# Patient Record
Sex: FEMALE | Race: WHITE | NOT HISPANIC OR LATINO | Employment: FULL TIME | URBAN - METROPOLITAN AREA
[De-identification: names, ages, dates, MRNs, and addresses within clinical notes are randomized per-mention and may not be internally consistent; named-entity substitution may affect disease eponyms.]

---

## 2017-09-29 ENCOUNTER — OFFICE VISIT - RIVER FALLS (OUTPATIENT)
Dept: FAMILY MEDICINE | Facility: CLINIC | Age: 37
End: 2017-09-29

## 2017-09-29 ASSESSMENT — MIFFLIN-ST. JEOR: SCORE: 1905.08

## 2017-10-18 ENCOUNTER — OFFICE VISIT - RIVER FALLS (OUTPATIENT)
Dept: FAMILY MEDICINE | Facility: CLINIC | Age: 37
End: 2017-10-18

## 2017-10-30 ENCOUNTER — OFFICE VISIT - RIVER FALLS (OUTPATIENT)
Dept: FAMILY MEDICINE | Facility: CLINIC | Age: 37
End: 2017-10-30

## 2017-10-30 ASSESSMENT — MIFFLIN-ST. JEOR: SCORE: 1922.54

## 2018-02-07 ENCOUNTER — OFFICE VISIT - RIVER FALLS (OUTPATIENT)
Dept: FAMILY MEDICINE | Facility: CLINIC | Age: 38
End: 2018-02-07

## 2018-02-07 ASSESSMENT — MIFFLIN-ST. JEOR: SCORE: 1894.19

## 2018-02-16 ENCOUNTER — OFFICE VISIT - RIVER FALLS (OUTPATIENT)
Dept: FAMILY MEDICINE | Facility: CLINIC | Age: 38
End: 2018-02-16

## 2022-02-11 VITALS
TEMPERATURE: 97.5 F | WEIGHT: 252 LBS | HEART RATE: 59 BPM | SYSTOLIC BLOOD PRESSURE: 107 MMHG | HEIGHT: 71 IN | BODY MASS INDEX: 35.28 KG/M2 | DIASTOLIC BLOOD PRESSURE: 72 MMHG | TEMPERATURE: 98.6 F

## 2022-02-11 VITALS
BODY MASS INDEX: 36.15 KG/M2 | SYSTOLIC BLOOD PRESSURE: 122 MMHG | BODY MASS INDEX: 35.61 KG/M2 | WEIGHT: 258.25 LBS | SYSTOLIC BLOOD PRESSURE: 102 MMHG | HEIGHT: 71 IN | DIASTOLIC BLOOD PRESSURE: 64 MMHG | WEIGHT: 257 LBS | DIASTOLIC BLOOD PRESSURE: 74 MMHG | HEART RATE: 72 BPM | WEIGHT: 254.4 LBS | TEMPERATURE: 97.6 F | BODY MASS INDEX: 35.84 KG/M2 | OXYGEN SATURATION: 98 % | HEIGHT: 71 IN | HEART RATE: 70 BPM

## 2022-02-16 NOTE — PROGRESS NOTES
Patient:   ALTAGRACIA HUFFMAN            MRN: [id number]            FIN: [id number]               Age:   37 years     Sex:  Female     :  1980   Associated Diagnoses:   Binge eating disorder; Obesity   Author:   Treva Vasquez      Visit Information   Visit type:  Medical Nutrition Therapy.    Referral source:  Valery Mckeon MD.       Chief Complaint   Binge Eating Disorder (BED), Obesity       Interval History   Pt states she recently realized that she had a true eating disorder.  She states she has had a issue with binge eating most of her life.    Pt is  and has her 6yo son full time.  She never binges in front of him.  Binges happen only alone and typically in the car or in the evening after he is in bed.    Trigger foods: salty, crunchy, pastas, fast foods, will drink super size regular soda if going through drive up     frequently skipping breakfast and doesn't have am snack, does not ever bring a lunch - will overeat at a fast food resturant drive through, evening meal with son but not always balanced, evening binges and does have TV on or electronical device.             Health Status   Allergies:    Allergic Reactions (Selected)  Severity Not Documented  Latex (No reactions were documented)  Penicillin (No reactions were documented)   Medications:  (Selected)   Prescriptions  Prescribed  Imitrex 100 mg oral tablet: 1 tab(s) ( 100 mg ), PO, Once, Instructions: may repeat dose in 2 hours if needed, PRN: for migraine headache, # 9 tab(s), 11 Refill(s), Type: Soft Stop, Pharmacy: [facility], 1 tab(s) po once,PRN:for migraine headache,Instr:may repeat dos...  buPROPion 150 mg/24 hours (XL) oral tablet, extended release: 1 tab(s) ( 150 mg ), po, q 24 hrs, # 30 tab(s), 3 Refill(s), Type: Maintenance, Pharmacy: [facility], 1 tab(s) po q 24 hrs  loratadine 10 mg oral tablet: 1 tab(s) ( 10 mg ), PO, Daily, PRN: as needed for allergy symptoms, # 30 tab(s), 11 Refill(s), Type:  Maintenance, Pharmacy: [id number], 1 tab(s) po daily,PRN:as needed for allergy symptoms  Documented Medications  Documented  D3 1000: ( 1,000 International Unit ), po, daily, 0 Refill(s), Type: Maintenance  Multiple Vitamins oral tablet: 1 tab(s), po, daily, 0 Refill(s), Type: Maintenance  Vitamin B12: 0 Refill(s), Type: Maintenance  Xanax: po, tid, 0 Refill(s), Type: Maintenance   Problem list:    All Problems  Binge eating disorder / SNOMED CT 0697714441 / Confirmed  Anxiety and depression / SNOMED CT 117543431 / Confirmed  Obesity / SNOMED CT 9228766151 / Probable      Histories   Past Medical History:    No active or resolved past medical history items have been selected or recorded.   Family History:    Migraine  Mother  Multiple sclerosis  Father     Procedure history:    No active procedure history items have been selected or recorded.   Social History:        Alcohol Assessment            Current, 1-2 times per year, 1 drinks/episode average.  2 drinks/episode maximum.      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      Home and Environment Assessment            Marital status: Single.  Risks in environment: sometimes wears bike helmet.      Nutrition and Health Assessment            Type of diet: Regular.      Exercise and Physical Activity Assessment            Exercise frequency: Never.      Sexual Assessment            Sexually active: No.        Physical Examination   Measurements from flowsheet : Measurements   10/30/2017 1:22 PM CDT Height Measured - Standard 71 in    Weight Measured - Standard 258.25 lb    BSA 2.42 m2    Body Mass Index 36.01 kg/m2         Impression and Plan   Diagnosis     Binge eating disorder (BAS95-GC F50.81).     Obesity (PSM57-BN E66.9).       Education today on   - moving towards mindfulness  "of the experience of eating, understanding and listening to body signals  - do not eat if doing anything else besides eating (no TV, electronics, book, etc.)    - eat with awareness, choosing what and how much to eat, not eating because of routine  - hunger/ fullness scale  - 100 things to do besides eating    Starting with small realistic goals   Fluids: eliminating regular sodas and artifically sweetened drinks/products   (teas, coffee, water with lemon/lime, Bess)  work towards 12 cups of fluids/ day 2 cups before and after meals    am: eat breakfast - choose 1 from the following options   light whole grain english muffin with an egg  Protein shake   smoothie with tofu, flax, fruit etc    snack: 1/4 cup nuts 2-3 days/ week, vegetables in ziplock bag 2-3 days/ week with 2T hummus    Lunch: bring from home  \"healthier\" microwavable meals Janelle's, healthy choice steamers 1 meal  leftovers stirfry, taco salad, broth soup can have with a fruit    Evening: whatever it is make sure there is a vegetable with it    As a result of improved eating habits during the day pt will hopefully in turn have better habits in the evening    (Did not want to overwhelm pt today with a lot of changes) - starting with fluids and am/noon meals    Overall calorie recommendation 1850 - 2100    Pt to f/u in 6 weeks       Professional Services   Time spent with pt 60 min   cc Dr. Mckeon  "

## 2022-02-16 NOTE — PROGRESS NOTES
Chief Complaint  f/u medicaiton/ ears  History of Present Illness  Patient is here today to follow-up on her binge eating disorder.,  Mood disorder, and serous otitis media.  She reports that she has been taking the bupropion 12 hour release pill twice daily and is tolerating it well.  She is not sure if it is helping her mood but she knows that her mood is not worsening at all.  He thinks that it is probably helping some overall though.  She does find it a little difficult to remember to take the pill twice daily and would like to try the 150 mg once daily dosing.  In regards to the binge eating disorder.  That continues to be work progress.  Establishing care with a counselor and meeting with the dietitian.  He is also going to be taking a community cooking class that the dietitian teaches.  In regards to her ears, she reports she has been taking her Flonase.  He would like us to recheck to see if there is fluid in her ears today.  Review of systems is as per HPI, otherwise negative.  Exam general alert and oriented ×3 in no acute distress.  HEENT pupils are equally round and reactive to light and extraocular motion is intact.  Panic membranes bilaterally continue to have air-fluid levels, the right ear is rather dull however the left ear.  Both eardrums are little retracted.  There is no redness her mucous membranes are moist and pink and no pharyngeal cobblestoning is present.  Neuro cranial nerves II through XII are grossly intact and patient has a normal gait.  Psych.  Lack  Assessment and plan patient with binge eating disorder, mixed depression and anxiety, patient is going to continue to follow-up with dietitian and continues to believe that as her mood improves her binge eating will improve also.  With her Flonase and we can reevaluate with patient when she comes in to follow-up with her medication change in 2-4 weeks.  May consider referral to audiology at that time.  15 minutes was spent with patient in  direct face-to-face contact of which greater than 50% of the time was spent counseling patient  Physical Exam     Vitals & Measurements    T: 97.6(Tympanic)  HR: 70(Peripheral)  BP: 122/74     WT: 257 lb   Assessment/Plan  Binge eating disorder      Ordered:  Return to Clinic (Request), RFV: follow up meds, please give phq9 and ALEXA 7 when rooming, Return in 3 weeks     Orders:    buPROPion, 1 tab(s) ( 150 mg ), po, q 24 hrs, # 30 tab(s), 3 Refill(s), Type: Maintenance, Pharmacy: [id number], 1 tab(s) po q 24 hrs  Patient Information  Name:  ALTAGRACIA HUFFMAN  Address:   [address] [place] [id number]  Sex: Female  YOB: 1980  Phone: [phone]  Emergency Contact: KWAME WOODSON  MRN: [id number]  FIN: [id number]  Location: [facility]  Date of Service: 10/18/2017  Primary Care Physician:   Lashawn Cervantes, [phone]  Problem List/Past Medical History   Ongoing    Binge eating disorder    Obesity   Historical  Medications    buPROPion 150 mg/24 hours (XL) oral tablet, extended release, 150 mg= 1 tab(s), po, q 24 hrs, 3 refills    D3 1000, 1000 International Unit, po, daily    Imitrex 100 mg oral tablet, 100 mg= 1 tab(s), po, once, PRN, 11 refills    loratadine 10 mg oral tablet, 10 mg= 1 tab(s), po, daily, PRN, 11 refills    Multiple Vitamins oral tablet, 1 tab(s), po, daily    Vitamin B12    Xanax, po, tid  Allergies  Latex  penicillin  Social History    Alcohol - 10/03/2017     Current, 1-2 times per year, 1 drinks/episode average. 2 drinks/episode maximum.    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    Exercise and Physical Activity - 10/03/2017     Exercise frequency: Never.    Home and Environment - 10/03/2017     Marital status: Single. Risks in environment: sometimes wears bike helmet.    Nutrition and Health - 10/03/2017     Type of diet: Regular.    Sexual - 10/03/2017     Sexually active: No.     Substance Abuse - 10/03/2017     Never    Tobacco - 10/03/2017     Never (less than 100 in lifetime)  Family History    Migraine: Mother.    Multiple sclerosis: Father.  Lab Results  Results (Last 90 days)  No results located.

## 2022-02-16 NOTE — PROGRESS NOTES
Patient:   ALTAGRACIA HUFFMAN            MRN: [id number]            FIN: [id number]               Age:   37 years     Sex:  Female     :  1980   Associated Diagnoses:   None   Author:   Toni Mcbride MD      Visit Information      Primary Care Provider (PCP):  Lashawn Cervantes    NPI# [id number]      Referring Provider:  Parminder Garland MD    NPI# [id number]      Chief Complaint   2018 1:46 PM CST    pt here to discuss fluid in ears, has had a couple of ear infections, and just recently finished antibiotics, says her ears have never really been a problem until recently, has tried flonase and claritin and this has not helped to drain it out        History of Present Illness   Asked to see by Slime for evaluation of ear problems.  Patient reports that she has never had problems with her ears until 7 months ago.  Since then she has had problems with pressure in the ears.  She had another ear infection recently.  She was told that she had fluid behind the ears.  No vertigo.  She gets migraines.        Review of Systems   Constitutional:  Negative.    Ear/Nose/Mouth/Throat:  Negative except as documented in history of present illness.    Respiratory:  Negative.       Health Status   Allergies:    Allergic Reactions (Selected)  Severity Not Documented  Latex (No reactions were documented)  Penicillin (No reactions were documented)   Medications:  (Selected)   Prescriptions  Prescribed  Imitrex 100 mg oral tablet: 1 tab(s) ( 100 mg ), PO, Once, Instructions: may repeat dose in 2 hours if needed, PRN: for migraine headache, # 9 tab(s), 11 Refill(s), Type: Soft Stop, Pharmacy: [facility], 1 tab(s) po once,PRN:for migraine headache,Instr:may repeat dos...  buPROPion 150 mg/24 hours (XL) oral tablet, extended release: 1 tab(s) ( 150 mg ), po, q 24 hrs, # 30 tab(s), 3 Refill(s), Type: Maintenance, Pharmacy: [facility], 1 tab(s) po q 24 hrs  Documented Medications  Documented  D3 1000: (  1,000 International Unit ), po, daily, 0 Refill(s), Type: Maintenance  Multiple Vitamins oral tablet: 1 tab(s), po, daily, 0 Refill(s), Type: Maintenance  Vitamin B12: 0 Refill(s), Type: Maintenance  Xanax: po, tid, 0 Refill(s), Type: Maintenance   Problem list:    All Problems  Binge eating disorder / SNOMED CT 4683149402 / Confirmed  Anxiety and depression / SNOMED CT 567298166 / Confirmed  Obesity / SNOMED CT 3872545518 / Probable      Histories   Past Medical History:    No active or resolved past medical history items have been selected or recorded.   Family History:    Migraine  Mother  Multiple sclerosis  Father     Procedure history:    No active procedure history items have been selected or recorded.      Physical Examination   Vital Signs   2/16/2018 1:46 PM CST    Temperature Tympanic      98.6 DegF     CONSTITUTIONAL  General Appearance:  Normal, well developed, well nourished, no obvious distress  Ability to Communicate:  communicates appropriately.    HEAD AND FACE  Appearance and Symmetry:  Normal, no scalp or facial scarring or suspicious lesions.  Paranasal sinuses tenderness:  Normal, Paranasal sinuses non tender    EARS  Clinical speech reception threshold:  Normal, able to hear normal speech.  Auricle:  Normal, Auricles without scars, lesions, masses.  External auditory canal:  Normal, External auditory canal normal.  Tympanic membrane:  Normal, Tympanic membranes normal without swelling or erythema.  Tympanic membrane mobility:  Normal, Normal tympanic membrane mobility.    NOSE (speculum or scope)  Architecture:  Normal, Grossly normal external nasal architecture with no masses or lesions.  Mucosa:  Normal mucosa, No polyps or masses.  Septum:  Normal, Septum non-obstructing.  Turbinates:  Normal, No turbinate abnormalities    ORAL CAVITY AND OROPHARYNX  Lips:  Normal.  Dental and gingiva:  Normal, No obvious dental or gingival disease.  Mucosa:  Normal, Moist mucous membranes.  Tongue:   Normal, Tongue mobile with no mucosal abnormalities  Hard and soft palate:  Normal, Hard and soft palate without cleft or mucosal lesions.  Oral pharynx:  Normal, Posterior pharynx without lesions or remarkable asymmetry.  Saliva:  Normal, Clear saliva.  Masses:  Normal, No palpable masses or pathologically enlarged lymph nodes.    NECK  Masses/lymph nodes:  Normal, No worrisome neck masses or lymph nodes.  Salivary glands:  Normal, Parotid and submandibular glands.  Trachea and larynx position:  Normal, Trachea and larynx midline.  Thyroid:  Normal, No thyroid abnormality.  Tenderness:  Normal, No cervical tenderness.  Suppleness:  Normal, Neck supple    NEUROLOGICAL  Speech pattern:  Normal, Proasaic    RESPIRATORY  Symmetry and Respiratory effort:  Normal, Symmetric chest movement and expansion with no increased intercostal retractions or use of accessory muscles.       Impression and Plan   Symptoms consistent with ET dysfunction.  No evidence of infection today.  I discussed the pathophysiology using online images.  No treatment necessary.  Follow up as needed.

## 2022-02-16 NOTE — PROGRESS NOTES
Patient:   ALTAGRACIA HUFFMAN            MRN: [id number]            FIN: [id number]               Age:   37 years     Sex:  Female     :  1980   Associated Diagnoses:   None   Author:   FrisgovindLashawn Hodge      Visit Information      Date of Service: 2018 06:00 pm  Performing Location: [facility]  Encounter#: [id number]      Primary Care Provider (PCP):  [facility] Lashawn KEYS    NPI# [id number]      Chief Complaint   2018 6:13 PM CST     Patient presents for ears-was infection and now pressure and pain ongoing x 4 months      History of Present Illness   Chief complaint reviewed and confirmed with patient.   The patient presents today with bilateral ear pain.  The pain is greater in the right ear than the left ear.  She has had a runny nose with clear nasal discharge for several weeks.  She was seen previously because she had a lot of fluid buildup in her inner ear.  She is having some difficulties with hearing.  The pain and pressure in her ears has been present for several months.  She previously was diagnosed with otitis media.  Her symptoms began after this.  She is ALLERGIC to penicillin; she had a rash from this.  Of interest, she also notes at today s visit that she believes she may have narcolepsy.  She has questions related to this for screening.          Review of Systems   Constitutional:  Negative.    Eye:  Negative.    Ear/Nose/Mouth/Throat:  Negative except as documented in history of present illness.       Health Status   Allergies:    Allergic Reactions (Selected)  Severity Not Documented  Latex (No reactions were documented)  Penicillin (No reactions were documented)   Medications:  (Selected)   Prescriptions  Prescribed  Ceftin 500 mg oral tablet: 1 tab(s) ( 500 mg ), PO, BID, # 14 tab(s), 0 Refill(s), Type: Maintenance, Pharmacy: [id number], 1 tab(s) po bid,x7 day(s)  Imitrex 100 mg oral tablet: 1 tab(s) ( 100 mg ), PO, Once, Instructions: may repeat dose in 2  hours if needed, PRN: for migraine headache, # 9 tab(s), 11 Refill(s), Type: Soft Stop, Pharmacy: [facility], 1 tab(s) po once,PRN:for migraine headache,Instr:may repeat dos...  buPROPion 150 mg/24 hours (XL) oral tablet, extended release: 1 tab(s) ( 150 mg ), po, q 24 hrs, # 30 tab(s), 3 Refill(s), Type: Maintenance, Pharmacy: [facility], 1 tab(s) po q 24 hrs  loratadine 10 mg oral tablet: 1 tab(s) ( 10 mg ), PO, Daily, PRN: as needed for allergy symptoms, # 30 tab(s), 11 Refill(s), Type: Maintenance, Pharmacy: [facility], 1 tab(s) po daily,PRN:as needed for allergy symptoms  Documented Medications  Documented  D3 1000: ( 1,000 International Unit ), po, daily, 0 Refill(s), Type: Maintenance  Multiple Vitamins oral tablet: 1 tab(s), po, daily, 0 Refill(s), Type: Maintenance  Vitamin B12: 0 Refill(s), Type: Maintenance  Xanax: po, tid, 0 Refill(s), Type: Maintenance      Physical Examination   Vital Signs   2/7/2018 6:13 PM CST Temperature Tympanic 97.5 DegF  LOW    Peripheral Pulse Rate 59 bpm  LOW    HR Method Electronic    Systolic Blood Pressure 107 mmHg    Diastolic Blood Pressure 72 mmHg    Mean Arterial Pressure 84 mmHg    BP Site Right arm    BP Method Electronic      General:  Alert and oriented, No acute distress.    Eye:  Normal conjunctiva.    HENT:       Ear: Left ear, Tympanic membrane ( Bulging, Erythematous, Fluid in middle ear ).    Respiratory:  Lungs are clear to auscultation.    Cardiovascular:  Normal rate, Regular rhythm.       Impression and Plan   Course:  Worsening.    Plan:    1. Prescription for cephalosporin called in for right otitis media.  2. Referred the patient to Dr. Toni Mcbride for evaluation of chronic inner ear buildup and pain.    3. Self-refer to Dr. Edmondson or Dr. Merida for sleep disturbances.  4. Tylenol or ibuprofen for ear pain.  5. If symptoms do not improve she should follow up sooner but for sure follow up with Dr. Mcbride in the next  several weeks..    Patient Instructions:       Counseled: Patient, Regarding diagnosis, Regarding treatment, Regarding medications.

## 2022-02-16 NOTE — PROGRESS NOTES
Patient:   ALTAGRACIA HUFFMAN            MRN: [id number]            FIN: [id number]               Age:   37 years     Sex:  Female     :  1980   Associated Diagnoses:   None   Author:   Valery Mckeon MD      Visit Information      Date of Service: 2017 09:43 am  Performing Location: [facility]  Encounter#: [id number]      Primary Care Provider (PCP):  Lashawn Cervantes    NPI# [id number]      Referring Provider:  Valery Mckeon MD    NPI# [id number]      Chief Complaint   2017 10:23 AM CDT   Annual px.      History of Present Illness   Patient is here today as a new patient to establish care.  She works as a  and reports that she has binge eating disorder and she would like to work with a dietitian and also with a counselor.  Also see fluid please see scanned in documents on the health history form for further information regarding her past medical surgical social family histories.  Also refer to this for her current medications and drug allergies.  She does have a history of classic migraine headaches.  She needs a refill on her Imitrex.  She usually uses 50 mg per episode.  Sometimes she does need an additional dose.  She thinks that she may have some poorly controlled anxiety or depression please also see scanned in PHQ 9.  She would like to get her mood under better control as a way to help control her binge eating disorder.  She does reports that she consumes food faster than normal until she feels uncomfortably full and then she will also eat the large amount of food when she is not hungry.  She will sometimes eat for long because she is embarrassed about how much she is eating.  After that she will fill it she will feel disgusted or depressed and guilty after the bands.  This overeating does cause a significant amount of stress in her life.  The frequency of the binging depends on how her mood is doing.  It is at least 1-3 binges per week and has been going on  for over 6 months.  She does not have any purging including no vomiting or using of laxatives.      Review of systems as per HPI otherwise negative        On exam general she is alert and oriented ×3 in no acute distress HEENT pupils are equal round reactive to light extraocular motion is intact hearing is grossly intact nares are patent mucous membranes are moist and pink neck is supple there is no thyromegaly or lymphadenopathy lungs are clear to auscultation bilaterally without wheezes rhonchi rales cardiovascular regular rate and rhythm no murmurs gallops or rubs skin is warm and dry without rashes abdomen is soft and has normoactive bowel sounds musculoskeletal gait is normal there is no focal deficits.  Neuro cranial nerves II through XII grossly intact.  Psych patient is clean and casually dressed, makes normal eye contact, memory is intact, judgment seems intact, she has no audio or visual hallucinations, concentration and thought seem appropriate, no suicidal or homicidal ideation            Review of Systems   Constitutional:  Negative except as documented in history of present illness.    Eye:  Negative except as documented in history of present illness.    Ear/Nose/Mouth/Throat:  Negative except as documented in history of present illness.    Respiratory:  Negative except as documented in history of present illness.    Cardiovascular:  Negative except as documented in history of present illness.    Gastrointestinal:  Negative except as documented in history of present illness.    Immunologic:  Negative except as documented in history of present illness.    Integumentary:  Negative except as documented in history of present illness.              Health Status   Allergies:    Allergic Reactions (Selected)  Severity Not Documented  Latex (No reactions were documented)  Penicillin (No reactions were documented)      Physical Examination   Vital Signs   9/29/2017 10:23 AM CDT Peripheral Pulse Rate 72 bpm     HR Method Electronic    Systolic Blood Pressure 102 mmHg    Diastolic Blood Pressure 64 mmHg    Mean Arterial Pressure 77 mmHg    BP Site Right arm    BP Method Manual    Oxygen Saturation 98 %      Measurements from flowsheet : Measurements   9/29/2017 10:23 AM CDT Height Measured - Standard 71 in    Weight Measured - Standard 254.4 lb    BSA 2.4 m2    Body Mass Index 35.48 kg/m2         Impression and Plan   Assessment and plan   1 binge eating disorder will make referral to both dietitian and to counseling.  2 Depressive disorder, will start patient on Wellbutrin 100 mg of the 12 hour medication 1 p.o. twice daily. return to clinic in 2 weeks for follow-up, sooner if needed.

## 2022-02-16 NOTE — PROGRESS NOTES
1) Oral antibiotic twice daily for 7 days  2) Refer to Dr. Mcbride  3) Make appointment to see Dr. Edmondson or Dr. Merida for sleep issues  4) Tylenol or Motrin for ear pain

## 2023-12-12 ENCOUNTER — OFFICE VISIT (OUTPATIENT)
Dept: FAMILY MEDICINE | Facility: CLINIC | Age: 43
End: 2023-12-12
Payer: COMMERCIAL

## 2023-12-12 VITALS
OXYGEN SATURATION: 96 % | BODY MASS INDEX: 41.52 KG/M2 | TEMPERATURE: 99.2 F | SYSTOLIC BLOOD PRESSURE: 100 MMHG | HEART RATE: 75 BPM | RESPIRATION RATE: 16 BRPM | WEIGHT: 290 LBS | DIASTOLIC BLOOD PRESSURE: 70 MMHG | HEIGHT: 70 IN

## 2023-12-12 DIAGNOSIS — Z13.1 SCREENING FOR DIABETES MELLITUS: ICD-10-CM

## 2023-12-12 DIAGNOSIS — Z51.81 ENCOUNTER FOR THERAPEUTIC DRUG MONITORING: ICD-10-CM

## 2023-12-12 DIAGNOSIS — F33.1 MODERATE EPISODE OF RECURRENT MAJOR DEPRESSIVE DISORDER (H): ICD-10-CM

## 2023-12-12 DIAGNOSIS — R05.1 ACUTE COUGH: Primary | ICD-10-CM

## 2023-12-12 DIAGNOSIS — F41.1 GENERALIZED ANXIETY DISORDER: ICD-10-CM

## 2023-12-12 DIAGNOSIS — R79.89 ELEVATED TSH: ICD-10-CM

## 2023-12-12 DIAGNOSIS — R50.9 FEVER, UNSPECIFIED FEVER CAUSE: ICD-10-CM

## 2023-12-12 DIAGNOSIS — Z13.220 LIPID SCREENING: ICD-10-CM

## 2023-12-12 PROBLEM — F50.819 BINGE EATING DISORDER: Status: ACTIVE | Noted: 2019-03-28

## 2023-12-12 LAB
FLUAV AG SPEC QL IA: NEGATIVE
FLUBV AG SPEC QL IA: NEGATIVE

## 2023-12-12 PROCEDURE — 87635 SARS-COV-2 COVID-19 AMP PRB: CPT | Performed by: FAMILY MEDICINE

## 2023-12-12 PROCEDURE — 87804 INFLUENZA ASSAY W/OPTIC: CPT | Mod: QW | Performed by: FAMILY MEDICINE

## 2023-12-12 PROCEDURE — 99203 OFFICE O/P NEW LOW 30 MIN: CPT | Performed by: FAMILY MEDICINE

## 2023-12-12 RX ORDER — RIBOFLAVIN (VITAMIN B2) 400 MG
TABLET ORAL
COMMUNITY
Start: 2023-03-24

## 2023-12-12 RX ORDER — HYDROCODONE/ACETAMINOPHEN 5 MG-500MG
TABLET ORAL
COMMUNITY

## 2023-12-12 RX ORDER — OMEGA-3-ACID ETHYL ESTERS 900 MG/1
CAPSULE, LIQUID FILLED ORAL
COMMUNITY

## 2023-12-12 RX ORDER — DIVALPROEX SODIUM 250 MG/1
TABLET, EXTENDED RELEASE ORAL
Qty: 30 TABLET | Refills: 1 | Status: SHIPPED | OUTPATIENT
Start: 2023-12-12 | End: 2024-02-01

## 2023-12-12 RX ORDER — TOPIRAMATE 100 MG/1
TABLET, FILM COATED ORAL
COMMUNITY
Start: 2023-09-30 | End: 2024-09-25

## 2023-12-12 RX ORDER — NAPROXEN 500 MG/1
TABLET ORAL
COMMUNITY
Start: 2023-01-31 | End: 2024-09-25

## 2023-12-12 RX ORDER — LACTOBACILLUS RHAMNOSUS GG 10B CELL
1 CAPSULE ORAL 2 TIMES DAILY
COMMUNITY

## 2023-12-12 RX ORDER — ELETRIPTAN HYDROBROMIDE 40 MG/1
TABLET, FILM COATED ORAL
COMMUNITY
Start: 2023-09-30

## 2023-12-12 RX ORDER — MAGNESIUM OXIDE 400 MG/1
400 TABLET ORAL
COMMUNITY
Start: 2023-03-24

## 2023-12-12 ASSESSMENT — ENCOUNTER SYMPTOMS
DIZZINESS: 0
HEMATURIA: 0
HEMATOCHEZIA: 0
HEADACHES: 1
COUGH: 1
MYALGIAS: 1
PALPITATIONS: 0
BREAST MASS: 0
FEVER: 0
CONSTIPATION: 0
NAUSEA: 0
FREQUENCY: 1
ABDOMINAL PAIN: 0
NERVOUS/ANXIOUS: 1
CHILLS: 0
ARTHRALGIAS: 1
WEAKNESS: 1
JOINT SWELLING: 0
SHORTNESS OF BREATH: 1
HEARTBURN: 1
DIARRHEA: 1
DYSURIA: 0
PARESTHESIAS: 1
SORE THROAT: 1
EYE PAIN: 0

## 2023-12-12 ASSESSMENT — ANXIETY QUESTIONNAIRES
3. WORRYING TOO MUCH ABOUT DIFFERENT THINGS: NEARLY EVERY DAY
IF YOU CHECKED OFF ANY PROBLEMS ON THIS QUESTIONNAIRE, HOW DIFFICULT HAVE THESE PROBLEMS MADE IT FOR YOU TO DO YOUR WORK, TAKE CARE OF THINGS AT HOME, OR GET ALONG WITH OTHER PEOPLE: EXTREMELY DIFFICULT
5. BEING SO RESTLESS THAT IT IS HARD TO SIT STILL: NEARLY EVERY DAY
GAD7 TOTAL SCORE: 21
7. FEELING AFRAID AS IF SOMETHING AWFUL MIGHT HAPPEN: NEARLY EVERY DAY
GAD7 TOTAL SCORE: 21
2. NOT BEING ABLE TO STOP OR CONTROL WORRYING: NEARLY EVERY DAY
1. FEELING NERVOUS, ANXIOUS, OR ON EDGE: NEARLY EVERY DAY
6. BECOMING EASILY ANNOYED OR IRRITABLE: NEARLY EVERY DAY

## 2023-12-12 ASSESSMENT — PATIENT HEALTH QUESTIONNAIRE - PHQ9
10. IF YOU CHECKED OFF ANY PROBLEMS, HOW DIFFICULT HAVE THESE PROBLEMS MADE IT FOR YOU TO DO YOUR WORK, TAKE CARE OF THINGS AT HOME, OR GET ALONG WITH OTHER PEOPLE: VERY DIFFICULT
5. POOR APPETITE OR OVEREATING: NEARLY EVERY DAY
SUM OF ALL RESPONSES TO PHQ QUESTIONS 1-9: 16
SUM OF ALL RESPONSES TO PHQ QUESTIONS 1-9: 16
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
## 2023-12-12 NOTE — PATIENT INSTRUCTIONS
[facility]    Address: [address], [place] [address]  Hours:   Open ? Closes 6?PM  Phone: [phone]  Suggest an edit   Own     Consider looking at the book overcoming binge eating second edition.    Preventive Health Recommendations  Female Ages 40 to 49    Yearly exam:   See your health care provider every year in order to  Review health changes.   Discuss preventive care.    Review your medicines if your doctor prescribed any.    Get a Pap test every three years (unless you have an abnormal result and your provider advises testing more often).    If you get Pap tests with HPV test, you only need to test every 5 years, unless you have an abnormal result. You do not need a Pap test if your uterus was removed (hysterectomy) and you have not had cancer.    You should be tested each year for STDs (sexually transmitted diseases), if you're at risk.   Ask your doctor if you should have a mammogram.    Have a colonoscopy (test for colon cancer) beginning at age 45.  Ask your provider about other options like a yearly FIT test or Cologuard test every 3 years (stool tests)      Have a cholesterol test every 5 years.     Have a diabetes test (fasting glucose) after age 45. If you are at risk for diabetes, you should have this test every 3 years.    Shots: Get a flu shot each year. Get a tetanus shot every 10 years.     Nutrition:   Eat at least 5 servings of fruits and vegetables each day.  Eat whole-grain bread, whole-wheat pasta and brown rice instead of white grains and rice.  Get adequate Calcium and Vitamin D.      Lifestyle  Exercise at least 150 minutes a week (an average of 30 minutes a day, 5 days a week). This will help you control your weight and prevent disease.  Limit alcohol to one drink per day.  No smoking.   Wear sunscreen to prevent skin cancer.  See your dentist every six months for an exam and cleaning.

## 2023-12-12 NOTE — PROGRESS NOTES
SUBJECTIVE:   Soraida is a 43 year old, presenting for the following:  Physical and Shoulder Pain (Hx of RA in family. Wants blood work to check. )        12/12/2023     3:21 PM   Additional Questions   Roomed by Andreea       Healthy Habits:     Getting at least 3 servings of Calcium per day:  Yes    Bi-annual eye exam:  Yes    Dental care twice a year:  NO    Sleep apnea or symptoms of sleep apnea:  Daytime drowsiness    Diet:  Vegetarian/vegan and Other    Frequency of exercise:  1 day/week    Duration of exercise:  Less than 15 minutes    Taking medications regularly:  Yes    Medication side effects:  None    Additional concerns today:  Yes      Today's PHQ-9 Score:       12/12/2023     3:21 PM   PHQ-9 SCORE   PHQ-9 Total Score MyChart 16 (Moderately severe depression)   PHQ-9 Total Score 16                   Have you ever done Advance Care Planning? (For example, a Health Directive, POLST, or a discussion with a medical provider or your loved ones about your wishes): No, advance care planning information given to patient to review.  Patient plans to discuss their wishes with loved ones or provider.      Social History     Tobacco Use    Smoking status: Never     Passive exposure: Never    Smokeless tobacco: Never   Substance Use Topics    Alcohol use: Not on file             12/12/2023     3:19 PM   Alcohol Use   Prescreen: >3 drinks/day or >7 drinks/week? No     Reviewed orders with patient.  Reviewed health maintenance and updated orders accordingly - { :405123}  {Chronicprobdata (optional):999367}    Breast Cancer Screening:    FHS-7:       12/12/2023     3:22 PM   Breast CA Risk Assessment (FHS-7)   Did any of your first-degree relatives have breast or ovarian cancer? No   Did any of your relatives have bilateral breast cancer? Unknown   Did any man in your family have breast cancer? No   Did any woman in your family have breast and ovarian cancer? Yes   Did any woman in your family have breast cancer  "before age 50 y? No   Do you have 2 or more relatives with breast and/or ovarian cancer? No   Do you have 2 or more relatives with breast and/or bowel cancer? No     {If any of the questions to the BCRA (FHS-7) are answered yes, consider ordering referral for genetic counseling (Optional) :034087}  {AMB Mammogram Decision Support (Optional) :846225}  Pertinent mammograms are reviewed under the imaging tab.    History of abnormal Pap smear: { :880264}     Reviewed and updated as needed this visit by clinical staff   Tobacco  Allergies  Meds              Reviewed and updated as needed this visit by Provider                 {HISTORY OPTIONS (Optional):223047}    Review of Systems   Constitutional:  Negative for chills and fever.   HENT:  Positive for sore throat. Negative for congestion, ear pain and hearing loss.    Eyes:  Positive for visual disturbance. Negative for pain.   Respiratory:  Positive for cough and shortness of breath.    Cardiovascular:  Negative for chest pain, palpitations and peripheral edema.   Gastrointestinal:  Positive for diarrhea and heartburn. Negative for abdominal pain, constipation, hematochezia and nausea.   Breasts:  Negative for tenderness, breast mass and discharge.   Genitourinary:  Positive for frequency and urgency. Negative for dysuria, genital sores, hematuria, pelvic pain, vaginal bleeding and vaginal discharge.   Musculoskeletal:  Positive for arthralgias and myalgias. Negative for joint swelling.   Skin:  Negative for rash.   Neurological:  Positive for weakness, headaches and paresthesias. Negative for dizziness.   Psychiatric/Behavioral:  Positive for mood changes. The patient is nervous/anxious.      {FEMALE ROS (Optional):379438}     OBJECTIVE:   /70 (BP Location: Right arm, Patient Position: Sitting)   Pulse 75   Temp 99.2  F (37.3  C) (Tympanic)   Resp 16   Ht 1.775 m (5' 9.88\")   Wt 131.5 kg (290 lb)   LMP 11/20/2023 (Approximate)   SpO2 96%   BMI 41.76 " "kg/m    Physical Exam  {Exam Choices (Optional):503242}    {Diagnostic Test Results (Optional):794630}    ASSESSMENT/PLAN:   {Diag Picklist:868326}    {Patient advised of split billing (Optional):897111}    Depression Screening Follow Up        12/12/2023     3:21 PM   PHQ   PHQ-9 Total Score 16   Q9: Thoughts of better off dead/self-harm past 2 weeks Not at all     {Last PHQ9 Response (Optional):226117}    Follow Up Actions Taken  {Positive screening follow up actions:379069::\"Crisis resource information provided in After Visit Summary\"}       COUNSELING:  {FEMALE COUNSELING MESSAGES:592765::\"Reviewed preventive health counseling, as reflected in patient instructions\"}        She reports that she has never smoked. She has never been exposed to tobacco smoke. She has never used smokeless tobacco.      {Counseling Resources  [place] Preventive Services Task Force  Cholesterol Screening  Health diet/nutrition  Pooled Cohorts Equation Calculator  [employer] MyPlate  ASA Prophylaxis  Lung CA Screening  Osteoporosis prevention/bone health :335080}  {Breast Cancer Risk Calculator  BRCA-Related Cancer Risk Assessment FHS-7 Tool :484734}  Valery Mckeon MD  [facility]  Answers submitted by the patient for this visit:  Patient Health Questionnaire (Submitted on 12/12/2023)  If you checked off any problems, how difficult have these problems made it for you to do your work, take care of things at home, or get along with other people?: Very difficult  PHQ9 TOTAL SCORE: 16    "

## 2023-12-13 LAB — SARS-COV-2 RNA RESP QL NAA+PROBE: NEGATIVE

## 2023-12-15 ENCOUNTER — E-VISIT (OUTPATIENT)
Dept: FAMILY MEDICINE | Facility: CLINIC | Age: 43
End: 2023-12-15
Payer: COMMERCIAL

## 2023-12-15 DIAGNOSIS — J06.9 VIRAL URI WITH COUGH: Primary | ICD-10-CM

## 2023-12-15 PROCEDURE — 99207 PR NON-BILLABLE SERV PER CHARTING: CPT | Performed by: FAMILY MEDICINE

## 2023-12-15 RX ORDER — GUAIFENESIN 1200 MG/1
1200 TABLET, EXTENDED RELEASE ORAL 2 TIMES DAILY
Qty: 60 TABLET | Refills: 0 | Status: SHIPPED | OUTPATIENT
Start: 2023-12-15 | End: 2024-09-25

## 2023-12-15 RX ORDER — DEXTROMETHORPHAN POLISTIREX 30 MG/5ML
10 SUSPENSION ORAL 2 TIMES DAILY PRN
Qty: 89 ML | Refills: 0 | Status: SHIPPED | OUTPATIENT
Start: 2023-12-15 | End: 2024-09-25

## 2023-12-15 NOTE — PATIENT INSTRUCTIONS
Viral Respiratory Infection: Care Instructions  Overview     A viral respiratory infection is an infection of the nose, sinuses, or throat caused by a virus. Colds and the flu are common types of viral respiratory infections.  The symptoms of a viral respiratory infection often start quickly. They include a fever, sore throat, and runny nose. You may also just not feel well. Or you may not want to eat much.  Most viral infections can be treated with home care. This may include drinking lots of fluids and taking over-the-counter pain medicine. You will probably feel better in 4 to 10 days.  Antibiotics are not used to treat a viral infection. Antibiotics don't kill viruses, so they won't help cure a viral illness.  In some cases, a doctor may prescribe antiviral medicine to help your body fight a serious viral infection.  Follow-up care is a key part of your treatment and safety. Be sure to make and go to all appointments, and call your doctor if you are having problems. It's also a good idea to know your test results and keep a list of the medicines you take.  How can you care for yourself at home?  To prevent dehydration, drink plenty of fluids. Choose water and other clear liquids until you feel better. If you have kidney, heart, or liver disease and have to limit fluids, talk with your doctor before you increase the amount of fluids you drink.  Ask your doctor if you can take an over-the-counter pain medicine, such as acetaminophen (Tylenol), ibuprofen (Advil, Motrin), or naproxen (Aleve). Be safe with medicines. Read and follow all instructions on the label. No one younger than 20 should take aspirin. It has been linked to Reye syndrome, a serious illness.  Be careful when taking over-the-counter cold or flu medicines and Tylenol at the same time. Many of these medicines have acetaminophen, which is Tylenol. Read the labels to make sure that you are not taking more than the recommended dose. Too much  "acetaminophen (Tylenol) can be harmful.  Get plenty of rest.  Use saline (saltwater) nasal washes to help keep your nasal passages open and wash out mucus and allergens. You can buy saline nose sprays at a grocery store or drugstore. Follow the instructions on the package. Or you can make your own at home. Add 1 teaspoon of non-iodized salt and 1 teaspoon of baking soda to 2 cups of distilled or boiled and cooled water. Fill a squeeze bottle or neti pot with the nasal wash. Then put the tip into your nostril, and lean over the sink. With your mouth open, gently squirt the liquid. Repeat on the other side.  Use a vaporizer or humidifier to add moisture to your bedroom. Follow the instructions for cleaning the machine.  Do not smoke or allow others to smoke around you. If you need help quitting, talk to your doctor about stop-smoking programs and medicines. These can increase your chances of quitting for good.  When should you call for help?   Call 911 anytime you think you may need emergency care. For example, call if:    You have severe trouble breathing.   Call your doctor now or seek immediate medical care if:    You have a new or higher fever.     Your fever lasts more than 48 hours.     You have trouble breathing.     You have a fever with a stiff neck or a severe headache.     You are sensitive to light.     You feel very sleepy or confused.   Watch closely for changes in your health, and be sure to contact your doctor if:    You do not get better as expected.   Where can you learn more?  Go to https://www.[employer].net/patiented  Enter Q795 in the search box to learn more about \"Viral Respiratory Infection: Care Instructions.\"  Current as of: June 13, 2023               Content Version: 13.8    6067-7943 [employer].   Care instructions adapted under license by your healthcare professional. If you have questions about a medical condition or this instruction, always ask your healthcare " professional. [employer] disclaims any warranty or liability for your use of this information.      Thank you for choosing us for your care. Dr Mckeon is out of the office and I am helping to cover your box.    Your symptoms are consistent with a viral infection. Viruses take 7-14 days to improve.    I have placed recommendations for medications that can be picked up by prescription or over the counter that can help with cough and congestion. You should also be using tylenol or ibuprofen.    If you aren't feeling better, urgent care is open in [place] on Saturdays and Sundays starting at 9am.    View your full visit summary for details by clicking on the link below. Your pharmacist will able to address any questions you may have about the medication.     If you're not feeling better within 5-7 days, please schedule an appointment.  You can schedule an appointment right here in [facility], or call [phone]      You will not be charged for this evisit.

## 2023-12-31 ENCOUNTER — HEALTH MAINTENANCE LETTER (OUTPATIENT)
Age: 43
End: 2023-12-31

## 2024-02-01 ENCOUNTER — OFFICE VISIT (OUTPATIENT)
Dept: FAMILY MEDICINE | Facility: CLINIC | Age: 44
End: 2024-02-01
Attending: FAMILY MEDICINE
Payer: COMMERCIAL

## 2024-02-01 ENCOUNTER — MYC MEDICAL ADVICE (OUTPATIENT)
Dept: FAMILY MEDICINE | Facility: CLINIC | Age: 44
End: 2024-02-01

## 2024-02-01 ENCOUNTER — ORDERS ONLY (AUTO-RELEASED) (OUTPATIENT)
Dept: FAMILY MEDICINE | Facility: CLINIC | Age: 44
End: 2024-02-01

## 2024-02-01 VITALS
RESPIRATION RATE: 12 BRPM | BODY MASS INDEX: 41.02 KG/M2 | HEART RATE: 58 BPM | HEIGHT: 71 IN | SYSTOLIC BLOOD PRESSURE: 106 MMHG | DIASTOLIC BLOOD PRESSURE: 74 MMHG | WEIGHT: 293 LBS | OXYGEN SATURATION: 96 % | TEMPERATURE: 99 F

## 2024-02-01 DIAGNOSIS — R79.89 ELEVATED TSH: ICD-10-CM

## 2024-02-01 DIAGNOSIS — Z86.69 HISTORY OF MIGRAINE: ICD-10-CM

## 2024-02-01 DIAGNOSIS — E78.5 HYPERLIPIDEMIA LDL GOAL <100: ICD-10-CM

## 2024-02-01 DIAGNOSIS — F33.1 MODERATE EPISODE OF RECURRENT MAJOR DEPRESSIVE DISORDER (H): ICD-10-CM

## 2024-02-01 DIAGNOSIS — Z86.39 HISTORY OF HYPOTHYROIDISM: ICD-10-CM

## 2024-02-01 DIAGNOSIS — N39.3 FEMALE STRESS INCONTINENCE: ICD-10-CM

## 2024-02-01 DIAGNOSIS — Z13.1 SCREENING FOR DIABETES MELLITUS: ICD-10-CM

## 2024-02-01 DIAGNOSIS — M79.601 BILATERAL ARM PAIN: ICD-10-CM

## 2024-02-01 DIAGNOSIS — Z00.00 ROUTINE GENERAL MEDICAL EXAMINATION AT A HEALTH CARE FACILITY: ICD-10-CM

## 2024-02-01 DIAGNOSIS — Z12.11 SCREEN FOR COLON CANCER: ICD-10-CM

## 2024-02-01 DIAGNOSIS — E66.01 MORBID OBESITY (H): Primary | ICD-10-CM

## 2024-02-01 DIAGNOSIS — Z12.31 VISIT FOR SCREENING MAMMOGRAM: Primary | ICD-10-CM

## 2024-02-01 DIAGNOSIS — Z71.89 ACP (ADVANCE CARE PLANNING): ICD-10-CM

## 2024-02-01 DIAGNOSIS — Z13.220 LIPID SCREENING: ICD-10-CM

## 2024-02-01 DIAGNOSIS — E03.9 ACQUIRED HYPOTHYROIDISM: ICD-10-CM

## 2024-02-01 DIAGNOSIS — Z11.59 NEED FOR HEPATITIS C SCREENING TEST: ICD-10-CM

## 2024-02-01 DIAGNOSIS — F50.819 BINGE EATING DISORDER: ICD-10-CM

## 2024-02-01 DIAGNOSIS — F41.1 GENERALIZED ANXIETY DISORDER: ICD-10-CM

## 2024-02-01 DIAGNOSIS — E66.01 MORBID OBESITY (H): ICD-10-CM

## 2024-02-01 DIAGNOSIS — Z51.81 ENCOUNTER FOR THERAPEUTIC DRUG MONITORING: ICD-10-CM

## 2024-02-01 DIAGNOSIS — M79.602 BILATERAL ARM PAIN: ICD-10-CM

## 2024-02-01 DIAGNOSIS — Z11.4 SCREENING FOR HIV (HUMAN IMMUNODEFICIENCY VIRUS): ICD-10-CM

## 2024-02-01 LAB
ALBUMIN SERPL BCG-MCNC: 4.3 G/DL (ref 3.5–5.2)
ALP SERPL-CCNC: 76 U/L (ref 40–150)
ALT SERPL W P-5'-P-CCNC: 15 U/L (ref 0–50)
AMMONIA PLAS-SCNC: 41 UMOL/L (ref 11–51)
ANION GAP SERPL CALCULATED.3IONS-SCNC: 10 MMOL/L (ref 7–15)
AST SERPL W P-5'-P-CCNC: 14 U/L (ref 0–45)
BILIRUB SERPL-MCNC: 0.4 MG/DL
BUN SERPL-MCNC: 12.7 MG/DL (ref 6–20)
CALCIUM SERPL-MCNC: 8.8 MG/DL (ref 8.6–10)
CHLORIDE SERPL-SCNC: 111 MMOL/L (ref 98–107)
CHOLEST SERPL-MCNC: 215 MG/DL
CREAT SERPL-MCNC: 0.82 MG/DL (ref 0.51–0.95)
DEPRECATED HCO3 PLAS-SCNC: 19 MMOL/L (ref 22–29)
EGFRCR SERPLBLD CKD-EPI 2021: 89 ML/MIN/1.73M2
FASTING STATUS PATIENT QL REPORTED: ABNORMAL
GLUCOSE SERPL-MCNC: 91 MG/DL (ref 70–99)
HBA1C MFR BLD: 5.3 % (ref 0–5.6)
HDLC SERPL-MCNC: 36 MG/DL
HIV 1+2 AB+HIV1 P24 AG SERPL QL IA: NONREACTIVE
LDLC SERPL CALC-MCNC: 146 MG/DL
NONHDLC SERPL-MCNC: 179 MG/DL
POTASSIUM SERPL-SCNC: 3.9 MMOL/L (ref 3.4–5.3)
PROT SERPL-MCNC: 7.1 G/DL (ref 6.4–8.3)
SODIUM SERPL-SCNC: 140 MMOL/L (ref 135–145)
T4 FREE SERPL-MCNC: 0.82 NG/DL (ref 0.9–1.7)
TRIGL SERPL-MCNC: 164 MG/DL
TSH SERPL DL<=0.005 MIU/L-ACNC: 5.14 UIU/ML (ref 0.3–4.2)

## 2024-02-01 PROCEDURE — 90686 IIV4 VACC NO PRSV 0.5 ML IM: CPT | Performed by: FAMILY MEDICINE

## 2024-02-01 PROCEDURE — 84443 ASSAY THYROID STIM HORMONE: CPT | Performed by: FAMILY MEDICINE

## 2024-02-01 PROCEDURE — 99215 OFFICE O/P EST HI 40 MIN: CPT | Mod: 25 | Performed by: FAMILY MEDICINE

## 2024-02-01 PROCEDURE — 84439 ASSAY OF FREE THYROXINE: CPT | Performed by: FAMILY MEDICINE

## 2024-02-01 PROCEDURE — 90471 IMMUNIZATION ADMIN: CPT | Performed by: FAMILY MEDICINE

## 2024-02-01 PROCEDURE — 87389 HIV-1 AG W/HIV-1&-2 AB AG IA: CPT | Performed by: FAMILY MEDICINE

## 2024-02-01 PROCEDURE — 80053 COMPREHEN METABOLIC PANEL: CPT | Performed by: FAMILY MEDICINE

## 2024-02-01 PROCEDURE — 91320 SARSCV2 VAC 30MCG TRS-SUC IM: CPT | Performed by: FAMILY MEDICINE

## 2024-02-01 PROCEDURE — 83036 HEMOGLOBIN GLYCOSYLATED A1C: CPT | Performed by: FAMILY MEDICINE

## 2024-02-01 PROCEDURE — 80061 LIPID PANEL: CPT | Performed by: FAMILY MEDICINE

## 2024-02-01 PROCEDURE — 82140 ASSAY OF AMMONIA: CPT | Performed by: FAMILY MEDICINE

## 2024-02-01 PROCEDURE — 90480 ADMN SARSCOV2 VAC 1/ONLY CMP: CPT | Performed by: FAMILY MEDICINE

## 2024-02-01 PROCEDURE — 99396 PREV VISIT EST AGE 40-64: CPT | Mod: 25 | Performed by: FAMILY MEDICINE

## 2024-02-01 PROCEDURE — 36415 COLL VENOUS BLD VENIPUNCTURE: CPT | Performed by: FAMILY MEDICINE

## 2024-02-01 RX ORDER — LEVOTHYROXINE SODIUM 25 UG/1
25 TABLET ORAL DAILY
Qty: 90 TABLET | Refills: 0 | Status: SHIPPED | OUTPATIENT
Start: 2024-02-01

## 2024-02-01 RX ORDER — DIVALPROEX SODIUM 500 MG/1
500 TABLET, EXTENDED RELEASE ORAL DAILY
Qty: 90 TABLET | Refills: 0 | Status: SHIPPED | OUTPATIENT
Start: 2024-02-01 | End: 2024-09-25

## 2024-02-01 SDOH — HEALTH STABILITY: PHYSICAL HEALTH: ON AVERAGE, HOW MANY MINUTES DO YOU ENGAGE IN EXERCISE AT THIS LEVEL?: 60 MIN

## 2024-02-01 SDOH — HEALTH STABILITY: PHYSICAL HEALTH: ON AVERAGE, HOW MANY DAYS PER WEEK DO YOU ENGAGE IN MODERATE TO STRENUOUS EXERCISE (LIKE A BRISK WALK)?: 4 DAYS

## 2024-02-01 ASSESSMENT — PATIENT HEALTH QUESTIONNAIRE - PHQ9
SUM OF ALL RESPONSES TO PHQ QUESTIONS 1-9: 9
10. IF YOU CHECKED OFF ANY PROBLEMS, HOW DIFFICULT HAVE THESE PROBLEMS MADE IT FOR YOU TO DO YOUR WORK, TAKE CARE OF THINGS AT HOME, OR GET ALONG WITH OTHER PEOPLE: SOMEWHAT DIFFICULT
SUM OF ALL RESPONSES TO PHQ QUESTIONS 1-9: 9

## 2024-02-01 ASSESSMENT — SOCIAL DETERMINANTS OF HEALTH (SDOH): HOW OFTEN DO YOU GET TOGETHER WITH FRIENDS OR RELATIVES?: THREE TIMES A WEEK

## 2024-02-01 NOTE — PROGRESS NOTES
Preventive Care Visit  [facility]  Valery Mckeon MD, Family Medicine  Feb 1, 2024    Assessment & Plan   Problem List Items Addressed This Visit       Binge eating disorder    Relevant Medications    Semaglutide-Weight Management (WEGOVY) 0.25 MG/0.5ML pen    Generalized anxiety disorder    Moderate episode of recurrent major depressive disorder (H)    Female stress incontinence     Other Visit Diagnoses       Visit for screening mammogram    -  Primary    Relevant Orders    MA SCREENING DIGITAL BILAT - Future  (s+30)    Screening for diabetes mellitus        Relevant Orders    Comprehensive metabolic panel (BMP + Alb, Alk Phos, ALT, AST, Total. Bili, TP)    Screen for colon cancer        Relevant Orders    COLOGUARD(EXACT SCIENCES)    Screening for HIV (human immunodeficiency virus)        Relevant Orders    HIV Antigen Antibody Combo (Completed)    Need for hepatitis C screening test        Bilateral arm pain        Relevant Orders    Physical Therapy Referral    History of hypothyroidism        Relevant Orders    TSH with free T4 reflex    Routine general medical examination at a health care facility        Morbid obesity (H)        Relevant Medications    Semaglutide-Weight Management (WEGOVY) 0.25 MG/0.5ML pen    Other Relevant Orders    Nutrition Referral    ACP (advance care planning)        Hyperlipidemia LDL goal <100        Relevant Orders    Lipid panel reflex to direct LDL Fasting    History of migraine        Relevant Orders    Adult Neurology  Referral    Elevated TSH        Relevant Orders    T4 free (Completed)    Lipid screening        Encounter for therapeutic drug monitoring               Morbid obesity with stress incontinence, she has no history of thyroid medullary cancer, no family history of multiple endocrine neoplasia, we did discuss the black box warning on GLP-1 agonists regarding risk of thyroid medullary carcinoma, also risk of pancreatitis.   Was able to go to [facility] and confirmed that her insurance does provide benefits for Wegovy.  Order provided for first initial prescription and placed referral for patient to see our dietitian, Niharika Vasquez.    Hypothyroidism patient was previously diagnosed with hypothyroidism and then has not been on any medications and repeat TSH has been normal.  Will get an updated TSH.    Urinary stress incontinence patient declines referral to physical therapy.  Did tell her information about the Perifit device.  Declines referral to urogyn at this time.  We could revisit that in the future if needed.    Bilateral upper arm pain.  Suspect biceps tendinitis.  Referral placed to physical therapy for this.    Screening for diabetes we will get a fasting comprehensive metabolic panel    Screening for colon cancer patient would like to do Cologuard, order provided    Using joint medical decision making ordered HIV test.  Patient declines hepatitis C test.  Reports that has been previously done and was negative.    Advance care planning discussed with patient today.  She is welcome to return a completed copy of the living directive forms provided to her today.        In addition to time spent performing either a procedure or wellness visit today, total time spent reviewing chart and preparing for appointment, with patient for appointment, and time spent charting and coordinating care on the day of the appointment in minutes was:  42                              12/12/2023     3:21 PM 2/1/2024     9:29 AM   PHQ   PHQ-9 Total Score 16 9   Q9: Thoughts of better off dead/self-harm past 2 weeks Not at all Not at all            12/12/2023     4:12 PM   ALEXA-7 SCORE   Total Score 21          FASTING labs           Counseling  Appropriate preventive services were discussed with this patient, including applicable screening as appropriate for fall prevention, nutrition, physical activity, Tobacco-use cessation, weight loss and  cognition.  Checklist reviewing preventive services available has been given to the patient.  Reviewed patient's diet, addressing concerns and/or questions.   The patient was instructed to see the dentist every 6 months.   The patient's PHQ-9 score is consistent with mild depression. She was provided with information regarding depression.             Gurpreet Quigley is a 43 year old, presenting for the following:  Physical (Migraines, Bilateral arm pain, weight management.)        2/1/2024     9:26 AM   Additional Questions   Roomed by [place]        Health Care Directive  Patient does not have a Health Care Directive or Living Will: Discussed advance care planning with patient; however, patient declined at this time.    HPI  Patient is here today for wellness visit as well as to discuss bilateral upper extremity pain, would like to discuss weight management and history of migraine headaches.            2/1/2024   General Health   How would you rate your overall physical health? (!) FAIR   Feel stress (tense, anxious, or unable to sleep) Very much   (!) STRESS CONCERN      2/1/2024   Nutrition   Three or more servings of calcium each day? Yes   Diet: Other   If other, please elaborate: Pescatarian   How many servings of fruit and vegetables per day? (!) 2-3   How many sweetened beverages each day? 0-1         2/1/2024   Exercise   Days per week of moderate/strenous exercise 4 days   Average minutes spent exercising at this level 60 min         2/1/2024   Social Factors   Frequency of gathering with friends or relatives Three times a week   Worry food won't last until get money to buy more No   Food not last or not have enough money for food? No   Do you have housing?  Yes   Are you worried about losing your housing? No   Lack of transportation? No   Unable to get utilities (heat,electricity)? No         2/1/2024   Dental   Dentist two times every year? (!) NO         2/1/2024   TB Screening   Were you born outside of  [place]?  No       Today's PHQ-9 Score:       2/1/2024     9:29 AM   PHQ-9 SCORE   PHQ-9 Total Score MyChart 9 (Mild depression)   PHQ-9 Total Score 9         2/1/2024   Substance Use   Alcohol more than 3/day or more than 7/wk Not Applicable   Do you use any other substances recreationally? No     Social History     Tobacco Use    Smoking status: Never     Passive exposure: Never    Smokeless tobacco: Never   Vaping Use    Vaping Use: Never used           12/12/2023   LAST FHS-7 RESULTS   1st degree relative breast or ovarian cancer No   Any relative bilateral breast cancer Unknown   Any male have breast cancer No   Any woman have breast and ovarian cancer Yes   Any woman with breast cancer before 50yrs No   2 or more relatives with breast and/or ovarian cancer No   2 or more relatives with breast and/or bowel cancer No              2/1/2024   One time HIV Screening   Previous HIV test? Yes         2/1/2024   STI Screening   New sexual partner(s) since last STI/HIV test? No     History of abnormal Pap smear: NO - age 30-65 PAP every 5 years with negative HPV co-testing recommended       The 10-year ASCVD risk score (Pearl QUIGLEY, et al., 2019) is: 1.2%    Values used to calculate the score:      Age: 43 years      Sex: Female      Is Non- : No      Diabetic: No      Tobacco smoker: No      Systolic Blood Pressure: 106 mmHg      Is BP treated: No      HDL Cholesterol: 36 mg/dL      Total Cholesterol: 215 mg/dL        2/1/2024   Contraception/Family Planning   Questions about contraception or family planning No        Reviewed and updated as needed this visit by Provider   Tobacco  Allergies  Meds  Problems  Med Hx  Surg Hx  Fam Hx            History reviewed. No pertinent past medical history.  Past Surgical History:   Procedure Laterality Date    BREAST BIOPSY, RT/LT Left      Review of Systems  Negative except as per HPI     Objective    Exam  /74 (BP Location: Right arm, Patient  "Position: Sitting, Cuff Size: Adult Large)   Pulse 58   Temp 99  F (37.2  C) (Tympanic)   Resp 12   Ht 1.803 m (5' 11\")   Wt 134.3 kg (296 lb)   LMP 02/01/2024 (Exact Date)   SpO2 96%   BMI 41.28 kg/m     Estimated body mass index is 41.28 kg/m  as calculated from the following:    Height as of this encounter: 1.803 m (5' 11\").    Weight as of this encounter: 134.3 kg (296 lb).  Physical Exam      General: alert and oriented ×3 no acute distress.    HEENT: pupils are equal round and reactive to light extraocular motion is intact. Normocephalic and atraumatic.     Hearing is grossly normal and there is no otorrhea.     Chest: has bilateral rise with no increased work of breathing. ctab    Cardiovascular: normal perfusion and brisk capillary refill.s1s2    Musculoskeletal: no gross focal abnormalities and normal gait.    Neuro: no gross focal abnormalities and memory seems intact.    Psychiatric: speech is clear and coherent and fluent. Patient dressed appropriately for the weather. Mood is appropriate and affect is full.                Signed Electronically by: Valery Mckeon MD    "

## 2024-02-01 NOTE — LETTER
My Depression Action Plan  Name: Soraida Griffin   Date of Birth 1980  Date: 2/1/2024    My doctor: Valery Mckeon   My clinic: [address] [address] [phone]            GREEN    ZONE   Good Control    What it looks like:   Things are going generally well. You have normal ups and downs. You may even feel depressed from time to time, but bad moods usually last less than a day.   What you need to do:  Continue to care for yourself (see self care plan)  Check your depression survival kit and update it as needed  Follow your physician s recommendations including any medication.  Do not stop taking medication unless you consult with your physician first.             YELLOW         ZONE Getting Worse    What it looks like:   Depression is starting to interfere with your life.   It may be hard to get out of bed; you may be starting to isolate yourself from others.  Symptoms of depression are starting to last most all day and this has happened for several days.   You may have suicidal thoughts but they are not constant.   What you need to do:     Call your care team. Your response to treatment will improve if you keep your care team informed of your progress. Yellow periods are signs an adjustment may need to be made.     Continue your self-care.  Just get dressed and ready for the day.  Don't give yourself time to talk yourself out of it.    Talk to someone in your support network.    Open up your Depression Self-Care Plan/Wellness Kit.             RED    ZONE Medical Alert - Get Help    What it looks like:   Depression is seriously interfering with your life.   You may experience these or other symptoms: You can t get out of bed most days, can t work or engage in other necessary activities, you have trouble taking care of basic hygiene, or basic responsibilities, thoughts of suicide or death that will not go away, self-injurious  behavior.     What you need to do:  Call your care team and request a same-day appointment. If they are not available (weekends or after hours) call your local crisis line, emergency room or 911.          Depression Self-Care Plan / Wellness Kit    Many people find that medication and therapy are helpful treatments for managing depression. In addition, making small changes to your everyday life can help to boost your mood and improve your wellbeing. Below are some tips for you to consider. Be sure to talk with your medical provider and/or behavioral health consultant if your symptoms are worsening or not improving.     Sleep   Sleep hygiene  means all of the habits that support good, restful sleep. It includes maintaining a consistent bedtime and wake time, using your bedroom only for sleeping or sex, and keeping the bedroom dark and free of distractions like a computer, smartphone, or television.     Develop a Healthy Routine  Maintain good hygiene. Get out of bed in the morning, make your bed, brush your teeth, take a shower, and get dressed. Don t spend too much time viewing media that makes you feel stressed. Find time to relax each day.    Exercise  Get some form of exercise every day. This will help reduce pain and release endorphins, the  feel good  chemicals in your brain. It can be as simple as just going for a walk or doing some gardening, anything that will get you moving.      Diet  Strive to eat healthy foods, including fruits and vegetables. Drink plenty of water. Avoid excessive sugar, caffeine, alcohol, and other mood-altering substances.     Stay Connected with Others  Stay in touch with friends and family members.    Manage Your Mood  Try deep breathing, massage therapy, biofeedback, or meditation. Take part in fun activities when you can. Try to find something to smile about each day.     Psychotherapy  Be open to working with a therapist if your provider recommends it.     Medication  Be sure to  take your medication as prescribed. Most anti-depressants need to be taken every day. It usually takes several weeks for medications to work. Not all medicines work for all people. It is important to follow-up with your provider to make sure you have a treatment plan that is working for you. Do not stop your medication abruptly without first discussing it with your provider.    Crisis Resources   These hotlines are for both adults and children. They and are open 24 hours a day, 7 days a week unless noted otherwise.    National Suicide Prevention Lifeline   988 or 3-310-562-VLQW (4340)    Crisis Text Line    www.crisistextline.org  Text HOME to 318876 from anywhere in the [place], anytime, about any type of crisis. A live, trained crisis counselor will receive the text and respond quickly.    [facility] Lifeline for LGBTQ Youth  A national crisis intervention and suicide lifeline for LGBTQ youth under 25. Provides a safe place to talk without judgement. Call 1-467.165.6677; text START to 696660 or visit www.thetrevorproject.org to talk to a trained counselor.    For [facility] crisis numbers, visit the [facility] website at:  https://[place].gov/dhs/people-we-serve/adults/health-care/mental-health/resources/crisis-contacts.jsp

## 2024-02-01 NOTE — PATIENT INSTRUCTIONS
Check on your Hepatitis B immunization status.    Perifit pelvic floor exerciser    Your Health Risk Assessment indicates you feel you are not in good health    A healthy lifestyle helps keep the body fit and the mind alert. It helps protect you from disease, helps you fight disease, and helps prevent chronic disease (disease that doesn't go away) from getting worse. This is important as you get older and begin to notice twinges in muscles and joints and a decline in the strength and stamina you once took for granted. A healthy lifestyle includes good healthcare, good nutrition, weight control, recreation, and regular exercise. Avoid harmful substances and do what you can to keep safe. Another part of a healthy lifestyle is stay mentally active and socially involved.    Good healthcare   Have a wellness visit every year.   If you have new symptoms, let us know right away. Don't wait until the next checkup.   Take medicines exactly as prescribed and keep your medicines in a safe place. Tell us if your medicine causes problems.   Healthy diet and weight control   Eat 3 or 4 small, nutritious, low-fat, high-fiber meals a day. Include a variety of fruits, vegetables, and whole-grain foods.   Make sure you get enough calcium in your diet. Calcium, vitamin D, and exercise help prevent osteoporosis (bone thinning).   If you live alone, try eating with others when you can. That way you get a good meal and have company while you eat it.   Try to keep a healthy weight. If you eat more calories than your body uses for energy, it will be stored as fat and you will gain weight.     Recreation   Recreation is not limited to sports and team events. It includes any activity that provides relaxation, interest, enjoyment, and exercise. Recreation provides an outlet for physical, mental, and social energy. It can give a sense of worth and achievement. It can help you stay healthy.    Mental Exercise and Social Involvement  Mental and  emotional health is as important as physical health. Keep in touch with friends and family. Stay as active as possible. Continue to learn and challenge yourself.   Things you can do to stay mentally active are:  Learn something new, like a foreign language or musical instrument.   Play SCRABBLE or do crossword puzzles. If you cannot find people to play these games with you at home, you can play them with others on your computer through the Internet.   Join a games club--anything from card games to chess or checkers or lawn bowling.   Start a new hobby.   Go back to school.   Volunteer.   Read.   Keep up with world events.  Eating Healthy Foods: Care Instructions  With every meal, you can make healthy food choices. Try to eat a variety of fruits, vegetables, whole grains, lean proteins, and low-fat dairy products. This can help you get the right balance of nutrients, including vitamins and minerals. Small changes add up over time. You can start by adding one healthy food to your meals each day.    Try to make half your plate fruits and vegetables, one-fourth whole grains, and one-fourth lean proteins. Try including dairy with your meals.   Eat more fruits and vegetables. Try to have them with most meals and snacks.   Foods for healthy eating    Fruits    These can be fresh, frozen, canned, or dried.  Try to choose whole fruit rather than fruit juice.  Eat a variety of colors.    Vegetables    These can be fresh, frozen, canned, or dried.  Beans, peas, and lentils count too.    Whole grains    Choose whole-grain breads, cereals, and noodles.  Try brown rice.    Lean proteins    These can include lean meat, poultry, fish, and eggs.  You can also have tofu, beans, peas, lentils, nuts, and seeds.    Dairy    Try milk, yogurt, and cheese.  Choose low-fat or fat-free when you can.  If you need to, use lactose-free milk or fortified plant-based milk products, such as soy milk.    Water    Drink water when you're  "thirsty.  Limit sugar-sweetened drinks, including soda, fruit drinks, and sports drinks.  Where can you learn more?  Go to https://www.[employer].net/patiented  Enter T756 in the search box to learn more about \"Eating Healthy Foods: Care Instructions.\"  Current as of: February 28, 2023               Content Version: 13.8    1735-3424 [employer].   Care instructions adapted under license by your healthcare professional. If you have questions about a medical condition or this instruction, always ask your healthcare professional. [employer] disclaims any warranty or liability for your use of this information.      Learning About Stress  What is stress?     Stress is your body's response to a hard situation. Your body can have a physical, emotional, or mental response. Stress is a fact of life for most people, and it affects everyone differently. What causes stress for you may not be stressful for someone else.  A lot of things can cause stress. You may feel stress when you go on a job interview, take a test, or run a race. This kind of short-term stress is normal and even useful. It can help you if you need to work hard or react quickly. For example, stress can help you finish an important job on time.  Long-term stress is caused by ongoing stressful situations or events. Examples of long-term stress include long-term health problems, ongoing problems at work, or conflicts in your family. Long-term stress can harm your health.  How does stress affect your health?  When you are stressed, your body responds as though you are in danger. It makes hormones that speed up your heart, make you breathe faster, and give you a burst of energy. This is called the fight-or-flight stress response. If the stress is over quickly, your body goes back to normal and no harm is done.  But if stress happens too often or lasts too long, it can have bad effects. Long-term stress can make you more likely to get " sick, and it can make symptoms of some diseases worse. If you tense up when you are stressed, you may develop neck, shoulder, or low back pain. Stress is linked to high blood pressure and heart disease.  Stress also harms your emotional health. It can make you hong, tense, or depressed. Your relationships may suffer, and you may not do well at work or school.  What can you do to manage stress?  You can try these things to help manage stress:   Do something active. Exercise or activity can help reduce stress. Walking is a great way to get started. Even everyday activities such as housecleaning or yard work can help.  Try yoga or eliu chi. These techniques combine exercise and meditation. You may need some training at first to learn them.  Do something you enjoy. For example, listen to music or go to a movie. Practice your hobby or do volunteer work.  Meditate. This can help you relax, because you are not worrying about what happened before or what may happen in the future.  Do guided imagery. Imagine yourself in any setting that helps you feel calm. You can use online videos, books, or a teacher to guide you.  Do breathing exercises. For example:  From a standing position, bend forward from the waist with your knees slightly bent. Let your arms dangle close to the floor.  Breathe in slowly and deeply as you return to a standing position. Roll up slowly and lift your head last.  Hold your breath for just a few seconds in the standing position.  Breathe out slowly and bend forward from the waist.  Let your feelings out. Talk, laugh, cry, and express anger when you need to. Talking with supportive friends or family, a counselor, or a ysabel leader about your feelings is a healthy way to relieve stress. Avoid discussing your feelings with people who make you feel worse.  Write. It may help to write about things that are bothering you. This helps you find out how much stress you feel and what is causing it. When you know  "this, you can find better ways to cope.  What can you do to prevent stress?  You might try some of these things to help prevent stress:  Manage your time. This helps you find time to do the things you want and need to do.  Get enough sleep. Your body recovers from the stresses of the day while you are sleeping.  Get support. Your family, friends, and community can make a difference in how you experience stress.  Limit your news feed. Avoid or limit time on social media or news that may make you feel stressed.  Do something active. Exercise or activity can help reduce stress. Walking is a great way to get started.  Where can you learn more?  Go to https://www.[employer].net/patiented  Enter N032 in the search box to learn more about \"Learning About Stress.\"  Current as of: February 26, 2023               Content Version: 13.8    4436-2611 [employer].   Care instructions adapted under license by your healthcare professional. If you have questions about a medical condition or this instruction, always ask your healthcare professional. [employer] disclaims any warranty or liability for your use of this information.      Learning About Depression Screening  What is depression screening?  Depression screening is a way to see if you have depression symptoms. It may be done by a doctor or counselor. It's often part of a routine checkup. That's because your mental health is just as important as your physical health.  Depression is a mental health condition that affects how you feel, think, and act. You may:  Have less energy.  Lose interest in your daily activities.  Feel sad and grouchy for a long time.  Depression is very common. It affects people of all ages.  Many things can lead to depression. Some people become depressed after they have a stroke or find out they have a major illness like cancer or heart disease. The death of a loved one or a breakup may lead to depression. It can run in " "families. Most experts believe that a combination of inherited genes and stressful life events can cause it.  What happens during screening?  You may be asked to fill out a form about your depression symptoms. You and the doctor will discuss your answers. The doctor may ask you more questions to learn more about how you think, act, and feel.  What happens after screening?  If you have symptoms of depression, your doctor will talk to you about your options.  Doctors usually treat depression with medicines or counseling. Often, combining the two works best. Many people don't get help because they think that they'll get over the depression on their own. But people with depression may not get better unless they get treatment.  The cause of depression is not well understood. There may be many factors involved. But if you have depression, it's not your fault.  A serious symptom of depression is thinking about death or suicide. If you or someone you care about talks about this or about feeling hopeless, get help right away.  It's important to know that depression can be treated. Medicine, counseling, and self-care may help.  Where can you learn more?  Go to https://www.[employer].net/patiented  Enter T185 in the search box to learn more about \"Learning About Depression Screening.\"  Current as of: June 25, 2023               Content Version: 13.8    1833-0150 [employer].   Care instructions adapted under license by your healthcare professional. If you have questions about a medical condition or this instruction, always ask your healthcare professional. [employer] disclaims any warranty or liability for your use of this information.      Preventive Care Advice   This is general advice given by our system to help you stay healthy. However, your care team may have specific advice just for you. Please talk to your care team about your preventive care needs.  Nutrition  Eat 5 or more servings of " fruits and vegetables each day.  Try wheat bread, brown rice and whole grain pasta (instead of white bread, rice, and pasta).  Get enough calcium and vitamin D. Check the label on foods and aim for 100% of the RDA (recommended daily allowance).  Lifestyle  Exercise at least 150 minutes each week  (30 minutes a day, 5 days a week).  Do muscle strengthening activities 2 days a week. These help control your weight and prevent disease.  No smoking.  Wear sunscreen to prevent skin cancer.  Have a dental exam and cleaning every 6 months.  Yearly exams  See your health care team every year to talk about:  Any changes in your health.  Any medicines your care team has prescribed.  Preventive care, family planning, and ways to prevent chronic diseases.  Shots (vaccines)   HPV shots (up to age 26), if you've never had them before.  Hepatitis B shots (up to age 59), if you've never had them before.  COVID-19 shot: Get this shot when it's due.  Flu shot: Get a flu shot every year.  Tetanus shot: Get a tetanus shot every 10 years.  Pneumococcal, hepatitis A, and RSV shots: Ask your care team if you need these based on your risk.  Shingles shot (for age 50 and up)  General health tests  Diabetes screening:  Starting at age 35, Get screened for diabetes at least every 3 years.  If you are younger than age 35, ask your care team if you should be screened for diabetes.  Cholesterol test: At age 39, start having a cholesterol test every 5 years, or more often if advised.  Bone density scan (DEXA): At age 50, ask your care team if you should have this scan for osteoporosis (brittle bones).  Hepatitis C: Get tested at least once in your life.  STIs (sexually transmitted infections)  Before age 24: Ask your care team if you should be screened for STIs.  After age 24: Get screened for STIs if you're at risk. You are at risk for STIs (including HIV) if:  You are sexually active with more than one person.  You don't use condoms every  time.  You or a partner was diagnosed with a sexually transmitted infection.  If you are at risk for HIV, ask about PrEP medicine to prevent HIV.  Get tested for HIV at least once in your life, whether you are at risk for HIV or not.  Cancer screening tests  Cervical cancer screening: If you have a cervix, begin getting regular cervical cancer screening tests starting at age 21.  Breast cancer scan (mammogram): If you've ever had breasts, begin having regular mammograms starting at age 40. This is a scan to check for breast cancer.  Colon cancer screening: It is important to start screening for colon cancer at age 45.  Have a colonoscopy test every 10 years (or more often if you're at risk) Or, ask your provider about stool tests like a FIT test every year or Cologuard test every 3 years.  To learn more about your testing options, visit:   https://www.[employer]/493250.pdf.  For help making a decision, visit:   https://bit.SIMPLEROBB.COM/rj44659.  Prostate cancer screening test: If you have a prostate, ask your care team if a prostate cancer screening test (PSA) at age 55 is right for you.  Lung cancer screening: If you are a current or former smoker ages 50 to 80, ask your care team if ongoing lung cancer screenings are right for you.  For informational purposes only. Not to replace the advice of your health care provider. Copyright   2023 [employer]. All rights reserved. Clinically reviewed by the [facility]. [employer] [id number] - REV 01/24.

## 2024-02-06 NOTE — TELEPHONE ENCOUNTER
Routing message to PCP for recommendations on alternate medication than semaglutide.   Last OV 2/1/24 physical [facility]JP

## 2024-02-08 NOTE — CONFIDENTIAL NOTE
Please let patient know that I have sent in the starting dose of Trulicity.  It is in the same family of medications as Wegovy.  She will need to do 1 injection every week.  I would also like her to schedule an appointment with our dietitian.  The referral for this was placed on February 1.  If she does not end up taking the Trulicity we will need a follow-up appointment to discuss other medication options.

## 2024-09-25 ENCOUNTER — OFFICE VISIT (OUTPATIENT)
Dept: FAMILY MEDICINE | Facility: CLINIC | Age: 44
End: 2024-09-25
Payer: COMMERCIAL

## 2024-09-25 VITALS
SYSTOLIC BLOOD PRESSURE: 116 MMHG | BODY MASS INDEX: 41.02 KG/M2 | DIASTOLIC BLOOD PRESSURE: 72 MMHG | HEART RATE: 65 BPM | OXYGEN SATURATION: 98 % | HEIGHT: 71 IN | RESPIRATION RATE: 20 BRPM | WEIGHT: 293 LBS | TEMPERATURE: 98.8 F

## 2024-09-25 DIAGNOSIS — G43.109 MIGRAINE WITH AURA AND WITHOUT STATUS MIGRAINOSUS, NOT INTRACTABLE: Primary | ICD-10-CM

## 2024-09-25 DIAGNOSIS — E03.9 ACQUIRED HYPOTHYROIDISM: ICD-10-CM

## 2024-09-25 PROCEDURE — 99214 OFFICE O/P EST MOD 30 MIN: CPT

## 2024-09-25 RX ORDER — SUMATRIPTAN 100 MG/1
100 TABLET, FILM COATED ORAL
Qty: 10 TABLET | Refills: 0 | Status: SHIPPED | OUTPATIENT
Start: 2024-09-25

## 2024-09-25 RX ORDER — SUMATRIPTAN 100 MG/1
100 TABLET, FILM COATED ORAL
COMMUNITY
End: 2024-09-25

## 2024-09-25 RX ORDER — NAPROXEN 500 MG/1
500 TABLET ORAL 2 TIMES DAILY WITH MEALS
Qty: 10 TABLET | Refills: 0 | Status: SHIPPED | OUTPATIENT
Start: 2024-09-25

## 2024-09-25 ASSESSMENT — PATIENT HEALTH QUESTIONNAIRE - PHQ9
SUM OF ALL RESPONSES TO PHQ QUESTIONS 1-9: 8
10. IF YOU CHECKED OFF ANY PROBLEMS, HOW DIFFICULT HAVE THESE PROBLEMS MADE IT FOR YOU TO DO YOUR WORK, TAKE CARE OF THINGS AT HOME, OR GET ALONG WITH OTHER PEOPLE: SOMEWHAT DIFFICULT
SUM OF ALL RESPONSES TO PHQ QUESTIONS 1-9: 8

## 2024-09-25 ASSESSMENT — PAIN SCALES - GENERAL: PAINLEVEL: MODERATE PAIN (5)

## 2024-09-25 NOTE — PROGRESS NOTES
"  Assessment & Plan     Migraine with aura and without status migrainosus, not intractable  Patient with migraine that has been ongoing for approximately 3 days.  She is followed by neurology for control and treatment of her migraines.  She is in clinic today for refill of the medications that work.  She typically has less than 15 migraines per month.  She does occasionally have aura.  She does have light sensitivity today.  She is not currently on a preventative medication.  - SUMAtriptan (IMITREX) 100 MG tablet; Take 1 tablet (100 mg) by mouth at onset of headache for migraine. If symptoms persist or return, may repeat dose (usually same as first dose) after =2 hours. Maximum dose: 200 mg per 24 hours.  - naproxen (NAPROSYN) 500 MG tablet; Take 1 tablet (500 mg) by mouth 2 times daily (with meals). Not to take more than 9 days/month or 2x/week.    Acquired hypothyroidism  Was previously on levothyroxine, then had normal TSH without being on levothyroxine.  She would like to have this rechecked today to see if she should restart levothyroxine.  Patient to be notified of results and plan of care just as needed.  - TSH with free T4 reflex; Future      BMI  Estimated body mass index is 41.14 kg/m  as calculated from the following:    Height as of this encounter: 1.803 m (5' 11\").    Weight as of this encounter: 133.8 kg (295 lb).             Gurpreet Quigley is a 44 year old, presenting for the following health issues:  Migraine (X3 days.Light and sound sensitivity. Nausea and vomiting)      9/25/2024    10:10 AM   Additional Questions   Roomed by leena   Accompanied by n/a     Has had migraine for 3 days, last dose of sumatriptan was last night. Is working with nerurology to get control of migraines. Stopped taking topamax approx 1 year ago. Taking mag citrate and B2 supplementation which is helping with prevention.     Period allergies and stress together have caused today's migraine.     History of Present Illness " "      Headaches:   Since the patient's last clinic visit, headaches are: worsened  The patient is getting headaches:  3 days in row, monthly  She is not able to do normal daily activities when she has a migraine.  The patient is taking the following rescue/relief medications:  Naproxyn (Aleve) and sumatriptan (Imitrex)   Patient states \"I get some relief\" from the rescue/relief medications.   The patient is taking the following medications to prevent migraines:  Topomax  In the past 4 weeks, the patient has gone to an Urgent Care or Emergency Room 0 times times due to headaches.    She eats 2-3 servings of fruits and vegetables daily.She consumes 2 sweetened beverage(s) daily.She exercises with enough effort to increase her heart rate 9 or less minutes per day.  She exercises with enough effort to increase her heart rate 3 or less days per week.   She is taking medications regularly.                     Objective    /72 (BP Location: Right arm, Patient Position: Sitting)   Pulse 65   Temp 98.8  F (37.1  C) (Tympanic)   Resp 20   Ht 1.803 m (5' 11\")   Wt 133.8 kg (295 lb)   LMP 09/19/2024 (Exact Date)   SpO2 98%   BMI 41.14 kg/m    Body mass index is 41.14 kg/m .  Physical Exam     Physical Exam  Constitutional:       Appearance: Normal appearance.   HENT:      Head: Normocephalic.      Mouth/Throat:      Mouth: Mucous membranes are moist.   Eyes:      Extraocular Movements: Extraocular movements intact.      Conjunctiva/sclera: Conjunctivae normal.   Cardiovascular:      Rate and Rhythm: Normal rate.   Pulmonary:      Effort: Pulmonary effort is normal. No respiratory distress.   Skin:     General: Skin is warm and dry.      Capillary Refill: Capillary refill takes less than 2 seconds.   Neurological:      Mental Status: She is alert and oriented to person, place, and time.   Psychiatric:         Behavior: Behavior normal.         Thought Content: Thought content normal.               Signed " Electronically by: LUDMILA Ferrer CNP

## 2024-12-11 DIAGNOSIS — G43.109 MIGRAINE WITH AURA AND WITHOUT STATUS MIGRAINOSUS, NOT INTRACTABLE: ICD-10-CM

## 2024-12-11 RX ORDER — SUMATRIPTAN SUCCINATE 100 MG/1
100 TABLET ORAL
Qty: 10 TABLET | Refills: 0 | Status: SHIPPED | OUTPATIENT
Start: 2024-12-11

## 2025-01-02 ENCOUNTER — PATIENT OUTREACH (OUTPATIENT)
Dept: CARE COORDINATION | Facility: CLINIC | Age: 45
End: 2025-01-02
Payer: COMMERCIAL

## 2025-01-16 ENCOUNTER — PATIENT OUTREACH (OUTPATIENT)
Dept: CARE COORDINATION | Facility: CLINIC | Age: 45
End: 2025-01-16
Payer: COMMERCIAL

## 2025-01-17 PROBLEM — E66.9 OBESITY WITH BODY MASS INDEX 30 OR GREATER: Status: ACTIVE | Noted: 2025-01-17

## 2025-01-17 PROBLEM — G43.909 MIGRAINE: Status: ACTIVE | Noted: 2025-01-17

## 2025-01-17 PROBLEM — A60.00 HERPES, GENITAL: Status: ACTIVE | Noted: 2025-01-17

## 2025-01-22 DIAGNOSIS — J01.00 ACUTE NON-RECURRENT MAXILLARY SINUSITIS: Primary | ICD-10-CM

## 2025-01-22 RX ORDER — AZITHROMYCIN 500 MG/1
500 TABLET, FILM COATED ORAL DAILY
Qty: 7 TABLET | Refills: 0 | Status: SHIPPED | OUTPATIENT
Start: 2025-01-22 | End: 2025-01-29

## 2025-02-04 ENCOUNTER — OFFICE VISIT (OUTPATIENT)
Dept: FAMILY MEDICINE | Facility: CLINIC | Age: 45
End: 2025-02-04
Payer: COMMERCIAL

## 2025-02-04 VITALS
OXYGEN SATURATION: 96 % | DIASTOLIC BLOOD PRESSURE: 75 MMHG | HEIGHT: 71 IN | BODY MASS INDEX: 41.02 KG/M2 | RESPIRATION RATE: 16 BRPM | TEMPERATURE: 99.6 F | WEIGHT: 293 LBS | HEART RATE: 88 BPM | SYSTOLIC BLOOD PRESSURE: 110 MMHG

## 2025-02-04 DIAGNOSIS — R11.0 NAUSEA: Primary | ICD-10-CM

## 2025-02-04 DIAGNOSIS — R05.1 ACUTE COUGH: ICD-10-CM

## 2025-02-04 DIAGNOSIS — R10.11 ABDOMINAL PAIN, RIGHT UPPER QUADRANT: ICD-10-CM

## 2025-02-04 LAB
ALBUMIN SERPL BCG-MCNC: 4.2 G/DL (ref 3.5–5.2)
ALP SERPL-CCNC: 102 U/L (ref 40–150)
ALT SERPL W P-5'-P-CCNC: 35 U/L (ref 0–50)
ANION GAP SERPL CALCULATED.3IONS-SCNC: 15 MMOL/L (ref 7–15)
AST SERPL W P-5'-P-CCNC: 23 U/L (ref 0–45)
BASOPHILS # BLD AUTO: 0 10E3/UL (ref 0–0.2)
BASOPHILS NFR BLD AUTO: 0 %
BILIRUB SERPL-MCNC: 1.1 MG/DL
BUN SERPL-MCNC: 13.6 MG/DL (ref 6–20)
CALCIUM SERPL-MCNC: 9.1 MG/DL (ref 8.8–10.4)
CHLORIDE SERPL-SCNC: 103 MMOL/L (ref 98–107)
CREAT SERPL-MCNC: 0.92 MG/DL (ref 0.51–0.95)
EGFRCR SERPLBLD CKD-EPI 2021: 77 ML/MIN/1.73M2
EOSINOPHIL # BLD AUTO: 0.1 10E3/UL (ref 0–0.7)
EOSINOPHIL NFR BLD AUTO: 1 %
ERYTHROCYTE [DISTWIDTH] IN BLOOD BY AUTOMATED COUNT: 13 % (ref 10–15)
FLUAV AG SPEC QL IA: NEGATIVE
FLUBV AG SPEC QL IA: NEGATIVE
GLUCOSE SERPL-MCNC: 98 MG/DL (ref 70–99)
HCO3 SERPL-SCNC: 21 MMOL/L (ref 22–29)
HCT VFR BLD AUTO: 42.6 % (ref 35–47)
HGB BLD-MCNC: 14.2 G/DL (ref 11.7–15.7)
IMM GRANULOCYTES # BLD: 0 10E3/UL
IMM GRANULOCYTES NFR BLD: 0 %
LYMPHOCYTES # BLD AUTO: 0.8 10E3/UL (ref 0.8–5.3)
LYMPHOCYTES NFR BLD AUTO: 9 %
MCH RBC QN AUTO: 30 PG (ref 26.5–33)
MCHC RBC AUTO-ENTMCNC: 33.3 G/DL (ref 31.5–36.5)
MCV RBC AUTO: 90 FL (ref 78–100)
MONOCYTES # BLD AUTO: 0.5 10E3/UL (ref 0–1.3)
MONOCYTES NFR BLD AUTO: 5 %
NEUTROPHILS # BLD AUTO: 7.3 10E3/UL (ref 1.6–8.3)
NEUTROPHILS NFR BLD AUTO: 85 %
PLATELET # BLD AUTO: 249 10E3/UL (ref 150–450)
POTASSIUM SERPL-SCNC: 4 MMOL/L (ref 3.4–5.3)
PROT SERPL-MCNC: 7.4 G/DL (ref 6.4–8.3)
RBC # BLD AUTO: 4.74 10E6/UL (ref 3.8–5.2)
SARS-COV-2 RNA RESP QL NAA+PROBE: NEGATIVE
SODIUM SERPL-SCNC: 139 MMOL/L (ref 135–145)
WBC # BLD AUTO: 8.6 10E3/UL (ref 4–11)

## 2025-02-04 PROCEDURE — 85025 COMPLETE CBC W/AUTO DIFF WBC: CPT | Mod: QW | Performed by: FAMILY MEDICINE

## 2025-02-04 PROCEDURE — G2211 COMPLEX E/M VISIT ADD ON: HCPCS | Performed by: FAMILY MEDICINE

## 2025-02-04 PROCEDURE — 87804 INFLUENZA ASSAY W/OPTIC: CPT | Mod: QW | Performed by: FAMILY MEDICINE

## 2025-02-04 PROCEDURE — 99214 OFFICE O/P EST MOD 30 MIN: CPT | Performed by: FAMILY MEDICINE

## 2025-02-04 PROCEDURE — 80053 COMPREHEN METABOLIC PANEL: CPT | Performed by: FAMILY MEDICINE

## 2025-02-04 PROCEDURE — 36415 COLL VENOUS BLD VENIPUNCTURE: CPT | Performed by: FAMILY MEDICINE

## 2025-02-04 PROCEDURE — 87635 SARS-COV-2 COVID-19 AMP PRB: CPT | Performed by: FAMILY MEDICINE

## 2025-02-04 RX ORDER — ONDANSETRON 4 MG/1
4 TABLET, ORALLY DISINTEGRATING ORAL EVERY 8 HOURS PRN
Qty: 30 TABLET | Refills: 1 | Status: SHIPPED | OUTPATIENT
Start: 2025-02-04

## 2025-02-04 NOTE — PROGRESS NOTES
Assessment & Plan   Problem List Items Addressed This Visit    None  Visit Diagnoses       Nausea    -  Primary    Relevant Medications    ondansetron (ZOFRAN ODT) 4 MG ODT tab    Acute cough        Relevant Orders    Influenza A & B Antigen - Clinic Collect    COVID-19 Virus (Coronavirus) by PCR    CBC with Platelets & Differential    Comprehensive metabolic panel (BMP + Alb, Alk Phos, ALT, AST, Total. Bili, TP)    Abdominal pain, right upper quadrant        Relevant Orders    Comprehensive metabolic panel (BMP + Alb, Alk Phos, ALT, AST, Total. Bili, TP)        Patient was sick in January, got better, and now is sick again.  Symptoms started less than 3 days ago.  She had a hard time holding anything down last night.  She did find the ondansetron tablets helpful.  She has not noticed decreased urine output.  She has had some abdominal pain along with vomiting.  No blood in the vomitus.  Cough, body aches present no flu shot or COVID-vaccine this year.    On exam ears look fine, lungs are clear, heart is normal, abdomen has an area of tenderness in the left lower quadrant and the right lateral abdomen and seems to have a positive Yost sign no rebound or guarding and normal active bowel sounds    Suspect viral syndrome, cautioned patient that if she develops symptoms of dehydration including decreased urine output or lightheadedness with standing then she needs to go to the emergency room.  Will get a comprehensive metabolic panel and CBC.  If the abdominal pain continues then we will do more of an evaluation for possible gallbladder problems, we will get her switched over to some ondansetron orally disintegrating tablets.  Flu and COVID test are both pending.  If her abdominal pain worsens would like her to go to the ER for further evaluation.    Invited patient to come back for her yearly preventative visit and Pap smear when she is feeling better.    The longitudinal plan of care for the  "diagnosis(es)/condition(s) as documented were addressed during this visit. Due to the added complexity in care, I will continue to support Soraida in the subsequent management and with ongoing continuity of care.       BMI  Estimated body mass index is 41 kg/m  as calculated from the following:    Height as of this encounter: 1.803 m (5' 11\").    Weight as of this encounter: 133.4 kg (294 lb).   Weight management plan: work on TLC          Subjective   Soraida is a 44 year old, presenting for the following health issues:  URI (Patient started having increased sharp abdominal pains, vomiting, congestion, cough, body aches and pains since yesterday. Unable to sleep well. Unable to eat/drink. Was seen on 1/17 for URI-has missed work 6 days. )        2/4/2025    10:34 AM   Additional Questions   Roomed by Amber YIN   Accompanied by Self     History of Present Illness       Reason for visit:  Stomach pain (sharp,burning),vomiting (8 x),heavy chest, tiredness,weakness,difficulty breathing  Symptom onset:  1-3 days ago  Symptoms include:  See above  Symptom intensity:  Severe  Symptom progression:  Worsening  Had these symptoms before:  Yes  Has tried/received treatment for these symptoms:  Yes  Previous treatment was successful:  Yes  Prior treatment description:  Prescriptions,rest,time off work,fluids,antibiotics (stopped most symptoms but not coughing)  What makes it worse:  Laying flat,eating  What makes it better:  Sleep even if 1hr,when can drink   She is taking medications regularly.                     Objective    /75 (BP Location: Right arm, Patient Position: Sitting, Cuff Size: Adult Large)   Pulse 88   Temp 99.6  F (37.6  C)   Resp 16   Ht 1.803 m (5' 11\")   Wt 133.4 kg (294 lb)   LMP 01/20/2025 (Approximate)   SpO2 96%   BMI 41.00 kg/m    Body mass index is 41 kg/m .  Physical Exam               Signed Electronically by: Valery Mckeon MD    "

## 2025-03-16 ENCOUNTER — HEALTH MAINTENANCE LETTER (OUTPATIENT)
Age: 45
End: 2025-03-16

## 2025-07-31 ENCOUNTER — PATIENT OUTREACH (OUTPATIENT)
Dept: CARE COORDINATION | Facility: CLINIC | Age: 45
End: 2025-07-31
Payer: COMMERCIAL

## 2025-09-03 ENCOUNTER — OFFICE VISIT (OUTPATIENT)
Dept: CARDIOLOGY | Facility: CLINIC | Age: 45
End: 2025-09-03
Payer: COMMERCIAL

## 2025-09-03 VITALS
BODY MASS INDEX: 40.59 KG/M2 | SYSTOLIC BLOOD PRESSURE: 107 MMHG | DIASTOLIC BLOOD PRESSURE: 71 MMHG | WEIGHT: 291 LBS | HEART RATE: 53 BPM | RESPIRATION RATE: 16 BRPM

## 2025-09-03 DIAGNOSIS — E07.9 DISORDER OF THYROID: ICD-10-CM

## 2025-09-03 DIAGNOSIS — I48.0 PAROXYSMAL ATRIAL FIBRILLATION (H): Primary | ICD-10-CM

## 2025-09-03 RX ORDER — METOPROLOL SUCCINATE 25 MG/1
25 TABLET, EXTENDED RELEASE ORAL DAILY
Qty: 90 TABLET | Refills: 3 | Status: SHIPPED | OUTPATIENT
Start: 2025-09-03

## 2025-09-03 RX ORDER — METOPROLOL SUCCINATE 25 MG/1
25 TABLET, EXTENDED RELEASE ORAL DAILY
COMMUNITY
Start: 2025-08-26 | End: 2025-09-03